# Patient Record
Sex: FEMALE | Race: WHITE | NOT HISPANIC OR LATINO | URBAN - METROPOLITAN AREA
[De-identification: names, ages, dates, MRNs, and addresses within clinical notes are randomized per-mention and may not be internally consistent; named-entity substitution may affect disease eponyms.]

---

## 2017-09-18 ENCOUNTER — APPOINTMENT (OUTPATIENT)
Dept: WOMENS IMAGING | Facility: HOSPITAL | Age: 42
End: 2017-09-18

## 2017-09-18 PROCEDURE — TOMOSCRN: Performed by: RADIOLOGY

## 2017-09-18 PROCEDURE — G0202 SCR MAMMO BI INCL CAD: HCPCS | Performed by: RADIOLOGY

## 2017-09-18 PROCEDURE — 77067 SCR MAMMO BI INCL CAD: CPT | Performed by: RADIOLOGY

## 2017-10-05 ENCOUNTER — APPOINTMENT (OUTPATIENT)
Dept: WOMENS IMAGING | Facility: HOSPITAL | Age: 42
End: 2017-10-05

## 2017-10-05 PROCEDURE — G0206 DX MAMMO INCL CAD UNI: HCPCS | Performed by: RADIOLOGY

## 2017-10-05 PROCEDURE — 76641 ULTRASOUND BREAST COMPLETE: CPT | Performed by: RADIOLOGY

## 2017-10-05 PROCEDURE — 77061 BREAST TOMOSYNTHESIS UNI: CPT | Performed by: RADIOLOGY

## 2017-10-05 PROCEDURE — 77065 DX MAMMO INCL CAD UNI: CPT | Performed by: RADIOLOGY

## 2017-10-05 PROCEDURE — G0279 TOMOSYNTHESIS, MAMMO: HCPCS | Performed by: RADIOLOGY

## 2017-10-13 ENCOUNTER — APPOINTMENT (OUTPATIENT)
Dept: WOMENS IMAGING | Facility: HOSPITAL | Age: 42
End: 2017-10-13

## 2017-10-13 PROCEDURE — 19000 PUNCTURE ASPIR CYST BREAST: CPT | Performed by: RADIOLOGY

## 2017-10-13 PROCEDURE — 76942 ECHO GUIDE FOR BIOPSY: CPT | Performed by: RADIOLOGY

## 2018-09-21 ENCOUNTER — APPOINTMENT (OUTPATIENT)
Dept: WOMENS IMAGING | Facility: HOSPITAL | Age: 43
End: 2018-09-21

## 2018-09-21 PROCEDURE — 77063 BREAST TOMOSYNTHESIS BI: CPT | Performed by: RADIOLOGY

## 2018-09-21 PROCEDURE — 77067 SCR MAMMO BI INCL CAD: CPT | Performed by: RADIOLOGY

## 2018-12-07 ENCOUNTER — APPOINTMENT (OUTPATIENT)
Dept: WOMENS IMAGING | Facility: HOSPITAL | Age: 43
End: 2018-12-07

## 2018-12-07 PROCEDURE — 76942 ECHO GUIDE FOR BIOPSY: CPT | Performed by: RADIOLOGY

## 2018-12-07 PROCEDURE — 19000 PUNCTURE ASPIR CYST BREAST: CPT | Performed by: RADIOLOGY

## 2018-12-07 PROCEDURE — 76641 ULTRASOUND BREAST COMPLETE: CPT | Performed by: RADIOLOGY

## 2019-08-09 ENCOUNTER — APPOINTMENT (OUTPATIENT)
Dept: WOMENS IMAGING | Facility: HOSPITAL | Age: 44
End: 2019-08-09

## 2019-08-09 PROCEDURE — 77065 DX MAMMO INCL CAD UNI: CPT | Performed by: RADIOLOGY

## 2019-08-09 PROCEDURE — 77061 BREAST TOMOSYNTHESIS UNI: CPT | Performed by: RADIOLOGY

## 2019-08-09 PROCEDURE — 76641 ULTRASOUND BREAST COMPLETE: CPT | Performed by: RADIOLOGY

## 2019-08-09 PROCEDURE — G0279 TOMOSYNTHESIS, MAMMO: HCPCS | Performed by: RADIOLOGY

## 2019-08-23 ENCOUNTER — APPOINTMENT (OUTPATIENT)
Dept: WOMENS IMAGING | Facility: HOSPITAL | Age: 44
End: 2019-08-23

## 2019-08-23 PROCEDURE — 76942 ECHO GUIDE FOR BIOPSY: CPT | Performed by: RADIOLOGY

## 2019-08-23 PROCEDURE — 19000 PUNCTURE ASPIR CYST BREAST: CPT | Performed by: RADIOLOGY

## 2019-09-23 ENCOUNTER — APPOINTMENT (OUTPATIENT)
Dept: WOMENS IMAGING | Facility: HOSPITAL | Age: 44
End: 2019-09-23

## 2019-09-23 PROCEDURE — 77063 BREAST TOMOSYNTHESIS BI: CPT | Performed by: RADIOLOGY

## 2019-09-23 PROCEDURE — 77067 SCR MAMMO BI INCL CAD: CPT | Performed by: RADIOLOGY

## 2020-05-05 ENCOUNTER — APPOINTMENT (OUTPATIENT)
Dept: WOMENS IMAGING | Facility: HOSPITAL | Age: 45
End: 2020-05-05

## 2020-05-05 PROCEDURE — 77065 DX MAMMO INCL CAD UNI: CPT | Performed by: RADIOLOGY

## 2020-05-05 PROCEDURE — 77061 BREAST TOMOSYNTHESIS UNI: CPT | Performed by: RADIOLOGY

## 2020-05-05 PROCEDURE — G0279 TOMOSYNTHESIS, MAMMO: HCPCS | Performed by: RADIOLOGY

## 2020-05-05 PROCEDURE — 76641 ULTRASOUND BREAST COMPLETE: CPT | Performed by: RADIOLOGY

## 2020-11-06 ENCOUNTER — APPOINTMENT (OUTPATIENT)
Dept: WOMENS IMAGING | Facility: HOSPITAL | Age: 45
End: 2020-11-06

## 2020-11-06 PROCEDURE — 77066 DX MAMMO INCL CAD BI: CPT | Performed by: RADIOLOGY

## 2020-11-06 PROCEDURE — 77062 BREAST TOMOSYNTHESIS BI: CPT | Performed by: RADIOLOGY

## 2020-11-06 PROCEDURE — 76641 ULTRASOUND BREAST COMPLETE: CPT | Performed by: RADIOLOGY

## 2020-11-06 PROCEDURE — G0279 TOMOSYNTHESIS, MAMMO: HCPCS | Performed by: RADIOLOGY

## 2020-12-01 ENCOUNTER — APPOINTMENT (OUTPATIENT)
Dept: WOMENS IMAGING | Facility: HOSPITAL | Age: 45
End: 2020-12-01

## 2020-12-01 PROCEDURE — 19083 BX BREAST 1ST LESION US IMAG: CPT | Performed by: RADIOLOGY

## 2020-12-03 ENCOUNTER — TELEPHONE (OUTPATIENT)
Dept: SURGERY | Facility: CLINIC | Age: 45
End: 2020-12-03

## 2020-12-03 NOTE — TELEPHONE ENCOUNTER
New patient appointment with Dr. Higgins is scheduled on 12/11/2020 @ 11:30am.    Called and left message with patient about appointment times, patient to call back and confirm.    Sent patient a reminder letter in the mail.

## 2020-12-11 ENCOUNTER — OFFICE VISIT (OUTPATIENT)
Dept: SURGERY | Facility: CLINIC | Age: 45
End: 2020-12-11

## 2020-12-11 VITALS
HEIGHT: 66 IN | DIASTOLIC BLOOD PRESSURE: 74 MMHG | HEART RATE: 70 BPM | RESPIRATION RATE: 16 BRPM | SYSTOLIC BLOOD PRESSURE: 118 MMHG | OXYGEN SATURATION: 98 % | BODY MASS INDEX: 24.75 KG/M2 | WEIGHT: 154 LBS

## 2020-12-11 DIAGNOSIS — D24.2 INTRADUCTAL PAPILLOMA OF LEFT BREAST: Primary | ICD-10-CM

## 2020-12-11 DIAGNOSIS — R92.8 ABNORMAL FINDING ON BREAST IMAGING: ICD-10-CM

## 2020-12-11 PROCEDURE — 99213 OFFICE O/P EST LOW 20 MIN: CPT | Performed by: SURGERY

## 2020-12-11 RX ORDER — CETIRIZINE HYDROCHLORIDE 10 MG/1
10 TABLET ORAL DAILY
COMMUNITY

## 2020-12-11 RX ORDER — FLUTICASONE PROPIONATE 50 MCG
2 SPRAY, SUSPENSION (ML) NASAL DAILY
COMMUNITY

## 2020-12-11 NOTE — PROGRESS NOTES
[facility]     Referring Provider: Olesya aMrie MD     Chief complaint: Left breast intraductal papilloma     HPI: Ms. Petty Pratt is a 46 yo woman, seen at the request of Dr. Olesya Marie, for a new diagnosis of a left breast intraductal papilloma. This was detected as an incidental imaging abnormality on recent surveillance follow-up for other probably benign lesions. She has a past history of a benign left breast cyst aspiration in 2017, a benign right breast cyst aspiration in 2018, and then a benign left breast cyst aspiration in 2019. In 2019, several left breast complex cysts were placed in imaging surveillance and she has been getting repeat imaging since. Her follow-up imaging in 11/2020 demonstrated a new left breast subareolar lesion measuring 2 mm within a 7 mm dilated duct. Subsequent biopsy showed a papilloma. See imaging and pathology report details in breast history section below.     She says that she feels like her breasts are always lumpy. Her breasts are both always a little bit sore, left more than right, and especially before her menstrual cycle. She also notices that she sometimes has yellow spots on the tip of her left nipple, but denies any actual discharge. She denies any family history of breast or ovarian cancer.       I personally reviewed her records and summarized her relevant breast history/imaging:    10/13/17, Left Breast, Cyst Aspiration ([facility]):  -An 18-gauge needle was inserted into the center of the cyst at the 4:30 position. Approximately 2 cc of yellow fluid extended into the syringe. The walls of the cyst collapsed. There was complete sonographic resolution. The fluid was discarded.    9/21/18, Screening MMG with Fede ([facility]):  Heterogeneously Dense. There are scattered equal density, oval masses with circumscribed margins seen in both breasts. These findings are most consistent with multiple cysts. These cysts have been demonstrated on prior ultrasound. Some  have enlarged and some have decreased in size since prior exams. This is a typically benign pattern. The dominant cyst is in the right breast, Subareolar and measures 2.8 cm. The cysts in the left breast have decreased in size. No suspicious masses, suspicious microcalfications or areas of architectural distortion are identified.  BI-RADS 2: Benign.    18, Right Breast US ([facility]):  There is a large simple cyst with well defined, thin margins measuring 26 millimeters seen in the right breast in the 1:30 o’clock region and in the areolar edge region. Internal echotexture is anechoic. The patient notes this lump to be bothersome and uncomfortable. A few scattered smaller cysts are noted.  BI-RADS 4 A: Suspicious.    18, Right Breast, Cyst Aspiration ([facility]):  -An 18-gauge needle was inserted into the center of the cyst at the 1:30 position. Approximately 6 cc of tan fluid extended into the syringe. The walls of the cyst collapsed. There was complete sonographic resolution. The fluid was discarded.    19, Left Diagnostic MMG with Fede & Left Breast US ([facility]):  MM. There is an equal density, round mass measuring 13 millimeters with obscured margins seen in the 4:30 o’clock region of the left breast located 3 centimeters from the nipple. Mass correlates to the palpable abnormality in the 4:30 o’clock region of the left breast located 3 centimeters from the nipple.  2. There are several similar appearing equal density, round masses with circumscribed margins seen in the left breast. There are no suspicious calcifications or areas of distortion seen.   US:  1. Ultrasound is suggestive of a round complicated cyst vs solid mass with partially defined margins measuring 13 x 8 x 12 mm.   3. There is an oval elongated cluster of cysts with circumscribed margins measuring 9 x 4 x 16 mm seen in the left breast at 4 o’clock.  IMPRESSION:  1. Probable complicated cyst vs solid mass in the 4:30 o’clock region of the  left breast located 3 centimeters from the nipple is suspicious.  2. Clusters of cysts in the left breast at 11 o’clock are probably benign. A follow-up mammogram and ultrasound in 6 months is recommended.  3. Cluster of cysts in the left breast at 4 o’clock is probably benign. Follow-up mammography in 6 months is recommended.  BI-RADS 4A: Suspicious.    19, Left Breast, Cyst Aspiration ([facility]):  -An 18-gauge needle was inserted into the center of the cyst at the 4:30 position. Approximately 1/2 cc of yellow fluid extended into the syringe. The walls of the cyst collapsed. There was complete sonographic resolution. The fluid was discarded.    19, Screening MMG with Fed[facility] ([facility]):  Heterogeneously dense.  1. There are no suspicious masses, calcifications, or areas of architectural distortion. Previously seen large round mass is no longer present. The remaining nodular parenchyma is unchanged. At this time, there is nothing to suggest malignancy.  2. There are no suspicious masses, calcifications, or areas of architectural distortion. The parenchyma is nodular and unchanged other than recently aspirated cyst is no longer identified. The other small cysts are best seen sonographically and 6 months follow-up ultrasound was previously recommended and scheduled. This is still recommended at this time. There are no new or suspicious masses, calcifications or areas of distortion see. There are no findings to suggest malignancy at this time.  BI-RADS 2: Benign.    20, Left Diagnostic MMG with Fede & Left Breast US ([facility]):  MM. There are no significant changes from the prior exams. The parenchyma includes stable nodular asymmetries. No suspicious masses, suspicious microcalfications or areas of architectural distortion are identified.  3. On the present examination, there are multiple isodense, round masses with circumscribed margins in the left breast at 11 o’clock.  US:  2. On the present examination, there is  an oval elongated cluster of cysts with circumscribed margins measuring 9 x 4 x 19 mm in the left breast at 4 o’clock. There are no significant changes from the prior exam.  3. Ultrasound demonstrates multiple oval elongated clusters of cysts with circumscribed margins measuring 8 x 4 x 9 mm in the left breast at 11 o’clock.  4. There are scattered oval anechoic simple cysts with circumscribed margins seen in the left breast.  BI-RADS 3: Probably Benign. A follow-up mammogram and ultrasound in 6 months is recommended at time of bilateral annual exam.    11/6/20, Bilateral Diagnostic MMG with Fede & Left Breast US ([facility]):  MMG:  Heterogeneously dense.  1. On the present examination, there is a stable oval mass with obscured margins in the left breast.  Additional diffuse bilateral masses are noted unchanged. The parenchyma includes stable nodular asymmetries. There are no new suspicious masses, calcifications or areas of architectural distortion in either breast.  US:  1. Ultrasound demonstrates a stable oval parallel cluster o9f cysts with circumscribed margins measuring 17 x 5 x 13 mm in the left breast.  2. On the present examination, there are multiple stable oval parallel clusters of cysts with circumscribed margins measuring 8 x 4 x 6 mm in the left breast at 11 o’clock.  3. There is a new oval mass with circumscribed margins measuring 7 x 4 x 4 mm seen in the sub-areolar region of the left breast. A 2 mm round echogenic mass or solid component is noted within the larger anechoic mass versus duct.  IMPRESSOIN:  1. Stable cluster of cysts in the left breast is probably benign. Recommend follow-up targeted ultrasound in 6 months.  2. Stable clusters of cysts in the left breast at 11 o’clock are probably benign. Recommend follow-up targeted ultrasound in 6 months.  3. Mass in the sub-areolar region of the left breast is suspicious.  BI-RADS 4A: Suspicious.    12/1/20, Left Breast, US-Guided Biopsy ([facility]):  Left  Subareolar:   Intraductal papilloma with chronic inflammation.   Also present is fibrocystic changes with ductal dilatation and apocrine metaplasia.   -[place] clip. Concordant.      Review of Systems   Constitutional: Positive for fatigue. Negative for appetite change, chills, diaphoresis, fever and unexpected weight change.        10 lb weight gain in the last 8 months   HENT:   Negative for hearing loss, lump/mass, mouth sores, nosebleeds, sore throat, tinnitus, trouble swallowing and voice change.    Eyes: Negative for eye problems and icterus.   Respiratory: Negative for chest tightness, cough, hemoptysis, shortness of breath and wheezing.    Cardiovascular: Positive for leg swelling. Negative for chest pain and palpitations.   Gastrointestinal: Negative for abdominal distention, abdominal pain, blood in stool, constipation, diarrhea, nausea, rectal pain and vomiting.   Endocrine: Negative for hot flashes.   Genitourinary: Positive for frequency. Negative for bladder incontinence, difficulty urinating, dyspareunia, dysuria, hematuria, menstrual problem, nocturia, pelvic pain, vaginal bleeding and vaginal discharge.    Musculoskeletal: Positive for back pain. Negative for arthralgias, flank pain, gait problem, myalgias, neck pain and neck stiffness.   Skin: Negative for itching, rash and wound.   Neurological: Positive for headaches. Negative for dizziness, extremity weakness, gait problem, light-headedness, numbness, seizures and speech difficulty.   Hematological: Negative for adenopathy. Does not bruise/bleed easily.   Psychiatric/Behavioral: Negative for confusion, decreased concentration, depression, sleep disturbance and suicidal ideas. The patient is nervous/anxious.    I personally reviewed and updated the ROS.      Medications:    Current Outpatient Medications:   •  fluticasone (FLONASE) 50 MCG/ACT nasal spray, 2 sprays into the nostril(s) as directed by provider Daily., Disp: , Rfl:   •  ibuprofen  (ADVIL,MOTRIN) 100 MG/5ML suspension, Take  by mouth Every 6 (Six) Hours As Needed for Mild Pain ., Disp: , Rfl:   •  cetirizine (zyrTEC) 10 MG tablet, Take 10 mg by mouth Daily., Disp: , Rfl:   •  levonorgestrel (MIRENA) 20 MCG/24HR IUD, 1 each by Intrauterine route., Disp: , Rfl:   •  sertraline (ZOLOFT) 50 MG tablet, Take 50 mg by mouth Daily., Disp: , Rfl:       Allergies   Allergen Reactions   • Penicillins Rash       Past Medical History:   Diagnosis Date   • Anxiety    • Migraine        Past Surgical History:   Procedure Laterality Date   • DILATATION AND CURETTAGE     • EAR TUBES         Family History   Problem Relation Age of Onset   • Cervical cancer Mother    • Hodgkin's lymphoma Mother 61   • Leukemia Mother 67       Social History     Socioeconomic History   • Marital status:      Spouse name: Not on file   • Number of children: 2   • Years of education: Not on file   • Highest education level: Not on file   Occupational History   • Occupation:     Tobacco Use   • Smoking status: Never Smoker   • Smokeless tobacco: Never Used   Substance and Sexual Activity   • Alcohol use: Yes     Comment: wine/cocktails 1-2 week   • Drug use: Never   • Sexual activity: Defer   Social History Narrative    2 sons      Patient drinks 1-2 servings of caffeine per day.       GYNECOLOGIC HISTORY:   G: 3. P: 2. AB: 1.  Last menstrual period: 19  Age at menarche: 11  Age at first childbirth: 29  Lactation/How lon months  Age at menopause: premenopausal  Total years of oral contraceptive use: 10  Total years of hormone replacement therapy:0      Physical Exam  Vitals:    20 1139   BP: 118/74   Pulse: 70   Resp: 16   SpO2: 98%     ECOG 0 - Asymptomatic  General: NAD, well appearing  Psych: a&o x 3, normal mood and affect  Eyes: EOMI, no scleral icterus  ENMT: neck supple without masses or thyromegaly, mucus membranes moist  Resp: normal effort, CTAB  CV: RRR, no murmurs, no edema   GI: soft, NT,  ND  MSK: normal gait, normal ROM in bilateral shoulders  Lymph nodes: no cervical, supraclavicular or axillary lymphadenopathy  Breast: symmetric, moderate size, no ptosis  Right: No visible abnormalities on inspection while seated, with arms raised or hands on hips. No masses, skin changes, or nipple abnormalities.  Left: No visible abnormalities on inspection while seated, with arms raised or hands on hips. No masses, skin changes, or nipple abnormalities.    Left breast, in-office ultrasound: At 3:00, at the areolar edge, there is a small postbiopsy hematoma with biopsy clip visualized. This is located about 5 mm deep to the skin.       I have independently reviewed her imaging and here are my findings:   In the left subareolar breast, there initially was a 2 mm mass located within a 7 mm dilated duct. At 4:00 and at 11:00, there are 2 clusters of cysts that have been stable on imaging since 8/2019.      Assessment:  45 y.o. F with a new diagnosis of a left breast intraductal papilloma. She also has several, probably benign, left breast complex cysts/cluster of cysts in imaging surveillance. She also has bilateral fibrocystic changes and bilateral breast pain.    Discussion:  We discussed the diagnosis of intraductal papilloma. I explained that historically in the past, if a papilloma was found on core needle biopsy, a larger surgical excision of the area was recommended due to the low risk of upgrade (risk of identifying atypical or cancer cells in the vicinity of the lesion). However, more recent studies have shown that with an adequate biopsy sample (large core needle), a small mass (<10 mm), clear radiologic-pathologic concordance, and no atypical findings, the risk of upgrade of a benign solitary papilloma is exceedingly low (0-6%). In these cases, routine excision is not necessary and observation with imaging surveillance is an option. I think this would be very reasonable in her, especially since she has  other lesions which will require imaging surveillance anyway. She is in agreement with this plan.    We discussed her history of multiple bilateral cysts. I explained that cysts are benign and no intervention is necessary if they are asymptomatic. I also explained that her history of cysts does not increase her lifetime risk of breast cancer, nor does her mother's history of lymphoma (and she was under the impression of these things did).    We discussed the several left breast complex cysts/cluster of cysts. I explained the concept of a BI-RADS 3 designation and the process of imaging surveillance. We discussed that a BI-RADS 3 designation describes an imaging finding that is 98-99% likely to represent a benign process. We discussed that the most common management of a BI-RADS 3 finding is initial 6 month imaging surveillance and that the entire period of imaging surveillance can often take 2 years.     We discussed breast pain and how it can sometimes be difficult to treat. We discussed that this is often related to hormonal changes. We also discussed the importance of wearing a good supportive bra. She can try wearing a sports bra during the day. She also can try sleeping in a sports bra or tight camisole.     Plan:  -Follow-up in 5/2021 with left US with NP.  -She was instructed to call sooner with any questions, concerns or changes on BSE.    Jolene Higgins MD      CC:  MD Carmina Lopez MD Leigh Price, MD

## 2020-12-14 ENCOUNTER — TELEPHONE (OUTPATIENT)
Dept: SURGERY | Facility: CLINIC | Age: 45
End: 2020-12-14

## 2020-12-14 NOTE — TELEPHONE ENCOUNTER
US is scheduled on 5/10/2021 @ 10:00am @ [facility].    F/u appointment with Shirley Nielsen NP is scheduled on 5/18/2021 @ 12:30pm.    Called and spoke to patient, patient expressed v/u of appointment times.    Sent patient a reminder letter in the mail.

## 2021-05-10 ENCOUNTER — APPOINTMENT (OUTPATIENT)
Dept: WOMENS IMAGING | Facility: HOSPITAL | Age: 46
End: 2021-05-10

## 2021-05-10 PROCEDURE — 76642 ULTRASOUND BREAST LIMITED: CPT | Performed by: RADIOLOGY

## 2021-05-18 ENCOUNTER — OFFICE VISIT (OUTPATIENT)
Dept: SURGERY | Facility: CLINIC | Age: 46
End: 2021-05-18

## 2021-05-18 ENCOUNTER — TELEPHONE (OUTPATIENT)
Dept: SURGERY | Facility: CLINIC | Age: 46
End: 2021-05-18

## 2021-05-18 VITALS
SYSTOLIC BLOOD PRESSURE: 114 MMHG | DIASTOLIC BLOOD PRESSURE: 80 MMHG | OXYGEN SATURATION: 99 % | WEIGHT: 144 LBS | HEIGHT: 66 IN | BODY MASS INDEX: 23.14 KG/M2 | HEART RATE: 55 BPM

## 2021-05-18 DIAGNOSIS — R92.2 BREAST DENSITY: ICD-10-CM

## 2021-05-18 DIAGNOSIS — R92.8 ABNORMAL FINDING ON BREAST IMAGING: ICD-10-CM

## 2021-05-18 DIAGNOSIS — D24.2 INTRADUCTAL PAPILLOMA OF LEFT BREAST: Primary | ICD-10-CM

## 2021-05-18 DIAGNOSIS — N60.11 FIBROCYSTIC BREAST CHANGES, BILATERAL: ICD-10-CM

## 2021-05-18 DIAGNOSIS — N60.12 FIBROCYSTIC BREAST CHANGES, BILATERAL: ICD-10-CM

## 2021-05-18 PROBLEM — R92.30 BREAST DENSITY: Status: ACTIVE | Noted: 2021-05-18

## 2021-05-18 PROCEDURE — 99214 OFFICE O/P EST MOD 30 MIN: CPT | Performed by: NURSE PRACTITIONER

## 2021-05-18 NOTE — PROGRESS NOTES
[facility]     Referring Provider: Dr. Olesya Marie     Chief complaint: Left breast intraductal papilloma     HPI:   12/11/2020:  Seen by Dr Higgins  MsAnat Pratt is a 44 yo woman, seen at the request of Dr. Olesya Marie, for a new diagnosis of a left breast intraductal papilloma. This was detected as an incidental imaging abnormality on recent surveillance follow-up for other probably benign lesions. She has a past history of a benign left breast cyst aspiration in 2017, a benign right breast cyst aspiration in 2018, and then a benign left breast cyst aspiration in 2019. In 2019, several left breast complex cysts were placed in imaging surveillance and she has been getting repeat imaging since. Her follow-up imaging in 11/2020 demonstrated a new left breast subareolar lesion measuring 2 mm within a 7 mm dilated duct. Subsequent biopsy showed a papilloma. See imaging and pathology report details in breast history section below.     She says that she feels like her breasts are always lumpy. Her breasts are both always a little bit sore, left more than right, and especially before her menstrual cycle. She also notices that she sometimes has yellow spots on the tip of her left nipple, but denies any actual discharge. She denies any family history of breast or ovarian cancer.       I personally reviewed her records and summarized her relevant breast history/imaging:    10/13/17, Left Breast, Cyst Aspiration ([facility]):  -An 18-gauge needle was inserted into the center of the cyst at the 4:30 position. Approximately 2 cc of yellow fluid extended into the syringe. The walls of the cyst collapsed. There was complete sonographic resolution. The fluid was discarded.    9/21/18, Screening MMG with Fede ([facility]):  Heterogeneously Dense. There are scattered equal density, oval masses with circumscribed margins seen in both breasts. These findings are most consistent with multiple cysts. These cysts have been  demonstrated on prior ultrasound. Some have enlarged and some have decreased in size since prior exams. This is a typically benign pattern. The dominant cyst is in the right breast, Subareolar and measures 2.8 cm. The cysts in the left breast have decreased in size. No suspicious masses, suspicious microcalfications or areas of architectural distortion are identified.  BI-RADS 2: Benign.    18, Right Breast US ([facility]):  There is a large simple cyst with well defined, thin margins measuring 26 millimeters seen in the right breast in the 1:30 o’clock region and in the areolar edge region. Internal echotexture is anechoic. The patient notes this lump to be bothersome and uncomfortable. A few scattered smaller cysts are noted.  BI-RADS 4 A: Suspicious.    18, Right Breast, Cyst Aspiration ([facility]):  -An 18-gauge needle was inserted into the center of the cyst at the 1:30 position. Approximately 6 cc of tan fluid extended into the syringe. The walls of the cyst collapsed. There was complete sonographic resolution. The fluid was discarded.    19, Left Diagnostic MMG with Fede & Left Breast US ([facility]):  MM. There is an equal density, round mass measuring 13 millimeters with obscured margins seen in the 4:30 o’clock region of the left breast located 3 centimeters from the nipple. Mass correlates to the palpable abnormality in the 4:30 o’clock region of the left breast located 3 centimeters from the nipple.  2. There are several similar appearing equal density, round masses with circumscribed margins seen in the left breast. There are no suspicious calcifications or areas of distortion seen.   US:  1. Ultrasound is suggestive of a round complicated cyst vs solid mass with partially defined margins measuring 13 x 8 x 12 mm.   3. There is an oval elongated cluster of cysts with circumscribed margins measuring 9 x 4 x 16 mm seen in the left breast at 4 o’clock.  IMPRESSION:  1. Probable complicated cyst vs solid  mass in the 4:30 o’clock region of the left breast located 3 centimeters from the nipple is suspicious.  2. Clusters of cysts in the left breast at 11 o’clock are probably benign. A follow-up mammogram and ultrasound in 6 months is recommended.  3. Cluster of cysts in the left breast at 4 o’clock is probably benign. Follow-up mammography in 6 months is recommended.  BI-RADS 4A: Suspicious.    19, Left Breast, Cyst Aspiration ([facility]):  -An 18-gauge needle was inserted into the center of the cyst at the 4:30 position. Approximately 1/2 cc of yellow fluid extended into the syringe. The walls of the cyst collapsed. There was complete sonographic resolution. The fluid was discarded.    19, Screening MMG with Fede ([facility]):  Heterogeneously dense.  1. There are no suspicious masses, calcifications, or areas of architectural distortion. Previously seen large round mass is no longer present. The remaining nodular parenchyma is unchanged. At this time, there is nothing to suggest malignancy.  2. There are no suspicious masses, calcifications, or areas of architectural distortion. The parenchyma is nodular and unchanged other than recently aspirated cyst is no longer identified. The other small cysts are best seen sonographically and 6 months follow-up ultrasound was previously recommended and scheduled. This is still recommended at this time. There are no new or suspicious masses, calcifications or areas of distortion see. There are no findings to suggest malignancy at this time.  BI-RADS 2: Benign.    20, Left Diagnostic MMG with Fede & Left Breast US ([facility]):  MM. There are no significant changes from the prior exams. The parenchyma includes stable nodular asymmetries. No suspicious masses, suspicious microcalfications or areas of architectural distortion are identified.  3. On the present examination, there are multiple isodense, round masses with circumscribed margins in the left breast at 11  o’clock.  US:  2. On the present examination, there is an oval elongated cluster of cysts with circumscribed margins measuring 9 x 4 x 19 mm in the left breast at 4 o’clock. There are no significant changes from the prior exam.  3. Ultrasound demonstrates multiple oval elongated clusters of cysts with circumscribed margins measuring 8 x 4 x 9 mm in the left breast at 11 o’clock.  4. There are scattered oval anechoic simple cysts with circumscribed margins seen in the left breast.  BI-RADS 3: Probably Benign. A follow-up mammogram and ultrasound in 6 months is recommended at time of bilateral annual exam.    11/6/20, Bilateral Diagnostic MMG with Fede & Left Breast US ([facility]):  MMG:  Heterogeneously dense.  1. On the present examination, there is a stable oval mass with obscured margins in the left breast.  Additional diffuse bilateral masses are noted unchanged. The parenchyma includes stable nodular asymmetries. There are no new suspicious masses, calcifications or areas of architectural distortion in either breast.  US:  1. Ultrasound demonstrates a stable oval parallel cluster o9f cysts with circumscribed margins measuring 17 x 5 x 13 mm in the left breast.  2. On the present examination, there are multiple stable oval parallel clusters of cysts with circumscribed margins measuring 8 x 4 x 6 mm in the left breast at 11 o’clock.  3. There is a new oval mass with circumscribed margins measuring 7 x 4 x 4 mm seen in the sub-areolar region of the left breast. A 2 mm round echogenic mass or solid component is noted within the larger anechoic mass versus duct.  IMPRESSOIN:  1. Stable cluster of cysts in the left breast is probably benign. Recommend follow-up targeted ultrasound in 6 months.  2. Stable clusters of cysts in the left breast at 11 o’clock are probably benign. Recommend follow-up targeted ultrasound in 6 months.  3. Mass in the sub-areolar region of the left breast is suspicious.  BI-RADS 4A:  Suspicious.    12/1/20, Left Breast, US-Guided Biopsy ([facility]):  Left Subareolar:   Intraductal papilloma with chronic inflammation.   Also present is fibrocystic changes with ductal dilatation and apocrine metaplasia.   -[place] clip. Concordant.      Dr. Higgins discussed pathology results and treatment options for intraductal papilloma, recommendations for observation.  She also discussed fibrocystic breast changes as evidenced by numerous cysts seen in prior imaging with aspiration, and current imaging surveillance.  Interventions for breast discomfort were reviewed.    Interval History:    She returns today for follow up with improved breast discomfort. She is wearing a sports bra more often.    Left breast ultrasound completed 5/10/2021 was stable BI-RADS 3. (See full report below)      Review of Systems   Constitutional: Positive for fatigue. Negative for appetite change, chills, diaphoresis, fever and unexpected weight change.        10 lb weight gain in the last 8 months   HENT:   Negative for hearing loss, lump/mass, mouth sores, nosebleeds, sore throat, tinnitus, trouble swallowing and voice change.    Eyes: Negative for eye problems and icterus.   Respiratory: Negative for chest tightness, cough, hemoptysis, shortness of breath and wheezing.    Cardiovascular: Positive for leg swelling. Negative for chest pain and palpitations.   Gastrointestinal: Negative for abdominal distention, abdominal pain, blood in stool, constipation, diarrhea, nausea, rectal pain and vomiting.   Endocrine: Negative for hot flashes.   Genitourinary: Positive for frequency. Negative for bladder incontinence, difficulty urinating, dyspareunia, dysuria, hematuria, menstrual problem, nocturia, pelvic pain, vaginal bleeding and vaginal discharge.    Musculoskeletal: Positive for back pain. Negative for arthralgias, flank pain, gait problem, myalgias, neck pain and neck stiffness.   Skin: Negative for itching, rash and wound.    Neurological: Positive for headaches. Negative for dizziness, extremity weakness, gait problem, light-headedness, numbness, seizures and speech difficulty.   Hematological: Negative for adenopathy. Does not bruise/bleed easily.   Psychiatric/Behavioral: Negative for confusion, decreased concentration, depression, sleep disturbance and suicidal ideas. The patient is nervous/anxious.    I personally reviewed and updated the ROS.      Medications:    Current Outpatient Medications:   •  cetirizine (zyrTEC) 10 MG tablet, Take 10 mg by mouth Daily., Disp: , Rfl:   •  fluticasone (FLONASE) 50 MCG/ACT nasal spray, 2 sprays into the nostril(s) as directed by provider Daily., Disp: , Rfl:   •  ibuprofen (ADVIL,MOTRIN) 100 MG/5ML suspension, Take  by mouth Every 6 (Six) Hours As Needed for Mild Pain ., Disp: , Rfl:   •  levonorgestrel (MIRENA) 20 MCG/24HR IUD, 1 each by Intrauterine route., Disp: , Rfl:   •  sertraline (ZOLOFT) 50 MG tablet, Take 50 mg by mouth Daily., Disp: , Rfl:       Allergies   Allergen Reactions   • Penicillins Rash       Past Medical History:   Diagnosis Date   • Anxiety    • Migraine        Past Surgical History:   Procedure Laterality Date   • DILATATION AND CURETTAGE     • EAR TUBES         Family History   Problem Relation Age of Onset   • Cervical cancer Mother    • Hodgkin's lymphoma Mother 61   • Leukemia Mother 67       Social History     Socioeconomic History   • Marital status:      Spouse name: Not on file   • Number of children: 2   • Years of education: Not on file   • Highest education level: Not on file   Tobacco Use   • Smoking status: Never Smoker   • Smokeless tobacco: Never Used   Substance and Sexual Activity   • Alcohol use: Yes     Comment: wine/cocktails 1-2 week   • Drug use: Never   • Sexual activity: Defer     Patient drinks 1-2 servings of caffeine per day.       GYNECOLOGIC HISTORY:   G: 3. P: 2. AB: 1.  Last menstrual period: 12/4/20  Age at menarche: 11  Age at  first childbirth: 29  Lactation/How lon months  Age at menopause: premenopausal  Total years of oral contraceptive use: 10  Total years of hormone replacement therapy:0      Physical Exam  Vitals:    21 1219   BP: 114/80   Pulse: 55   SpO2: 99%     ECOG 0 - Asymptomatic  General: NAD, well appearing  Psych: a&o x 3, normal mood and affect  MSK: normal gait, normal ROM in bilateral shoulders  Lymph nodes: no cervical, supraclavicular or axillary lymphadenopathy  Breast: symmetric, moderate size, no ptosis  Right: No visible abnormalities on inspection while seated, with arms raised or hands on hips. No masses, skin changes, or nipple abnormalities.  Left: No visible abnormalities on inspection while seated, with arms raised or hands on hips. No masses, skin changes, or nipple abnormalities.    Im[facility]10[facility]2021: [facility]  Finding 1: Follow-up examination was performed for the cluster of cysts in the left breast, 11:00 seen on 2020.  On present exam, there is a stable oval parallel complicated cyst with circumscribed margins measuring 8 x 3 x 8 mm in the left breast at 11:00 located 1 cm from the nipple.  Finding 2: Follow-up exam was performed for the mass in the left breast, subareolar seen on 2020.  There is no ultrasound correlate status post biopsy.  Only more posterior simple cysts or anechoic duct ectasia seen.  Finding 3: Follow-up exam was performed for the oval mass seen on 2020.  On present exam, there is a cluster of cysts with circumscribed margins measuring 15 x 3 x 6 mm in the left breast at 4:00.  Finding has decreased in size.  No suspicious change or any new worrisome findings seen.  Impression:  Finding 1: Stable complicated cyst in left breast 11:00 located 1 cm from the nipple is probably benign.  A follow-up mammogram and ultrasound in 6 months is recommended.  Finding 2: Area in the left breast status post biopsy is  probably benign.  Follow-up mammogram ultrasound in 6 months is recommended.  Finding 3: Stable cluster of cysts in the left breast 4:00 is benign negative.  BI-RADS Category 3    Assessment:   1)   46 y.o. F with a new diagnosis of a left breast intraductal papilloma.     2)  Abnormal imaging left breast(5/5/2020)  She also has several, probably benign, left breast complex cysts/cluster of cysts in imaging surveillance.     3) Fibrocystic changes with bilateral breast pain.    4)  Breast density    Discussion:  1)  Dr Higgins discussed the diagnosis of intraductal papilloma. She explained that historically in the past, if a papilloma was found on core needle biopsy, a larger surgical excision of the area was recommended due to the low risk of upgrade (risk of identifying atypical or cancer cells in the vicinity of the lesion). However, more recent studies have shown that with an adequate biopsy sample (large core needle), a small mass (<10 mm), clear radiologic-pathologic concordance, and no atypical findings, the risk of upgrade of a benign solitary papilloma is exceedingly low (0-6%). In these cases, routine excision is not necessary and observation with imaging surveillance is an option. I think this would be very reasonable in her, especially since she has other lesions which will require imaging surveillance anyway. She is in agreement with this plan.    2)  We discussed the several left breast complex cysts/cluster of cysts. I explained the concept of a BI-RADS 3 designation and the process of imaging surveillance. We discussed that a BI-RADS 3 designation describes an imaging finding that is 98-99% likely to represent a benign process. We discussed that the most common management of a BI-RADS 3 finding is initial 6 month imaging surveillance and that the entire period of imaging surveillance can often take 2 years.     We discussed her history of multiple bilateral cysts. I explained that cysts are benign and  no intervention is necessary if they are asymptomatic. I also explained that her history of cysts does not increase her lifetime risk of breast cancer, nor does her mother's history of lymphoma (and she was under the impression of these things did).      3)  We discussed fibrocystic change and how this is benign and can sometimes be associated with increased breast density. I reassured her today that she had a normal clinical breast exam and recent normal bilateral imaging. I explained that cords of dense breast tissue or focal areas of fibrocystic change can sometimes feel like a lump on exam. This is common in women like her with heterogeneous and nodular tissue. I encouraged her to become familiar with her own self-exam over the next few months to establish a personal baseline.  Dr Higgins discussed breast pain and how it can sometimes be difficult to treat. We discussed that this is often related to hormonal changes. We also discussed the importance of wearing a good supportive bra. She can try wearing a sports bra during the day. She also can try sleeping in a sports bra or tight camisole. She will continue this practice.    5)  Breast density describes how the breasts look on a mammogram.  Breast and connective tissue are denser than fat and this difference shows up on the mammogram.  Young women often have dense breasts.  As we age, breast become less dense.  Dense breast can make it harder to find breast cancer on the mammogram.  Women with high breast density have an increased risk of breast cancer.  Educational materials regarding breast density were given and reviewed.  Tomosynthesis imaging will be completed with next screening study.    Plan:  -bilateral diagnostic mammogram with tomosynthesis and left limited US in 6 months at [facility] followed by exam    -She was instructed to call sooner with any questions, concerns or changes on BSE.    ENRIQUETA Sibley     I spent 35 minutes caring for Ms Pratt on  this date of service. This time includes time spent by me in the following activities: preparing for the visit, performing a medically appropriate examination and/or evaluation , counseling and educating the patient/family/caregiver, ordering medications, tests, or procedures and documenting information in the medical record.          CC:  MD Jaylyn Garcia MD

## 2021-11-22 ENCOUNTER — APPOINTMENT (OUTPATIENT)
Dept: WOMENS IMAGING | Facility: HOSPITAL | Age: 46
End: 2021-11-22

## 2021-11-22 PROCEDURE — 76641 ULTRASOUND BREAST COMPLETE: CPT | Performed by: RADIOLOGY

## 2021-11-22 PROCEDURE — G0279 TOMOSYNTHESIS, MAMMO: HCPCS | Performed by: RADIOLOGY

## 2021-11-22 PROCEDURE — 77066 DX MAMMO INCL CAD BI: CPT | Performed by: RADIOLOGY

## 2021-11-22 PROCEDURE — 77062 BREAST TOMOSYNTHESIS BI: CPT | Performed by: RADIOLOGY

## 2021-11-30 ENCOUNTER — OFFICE VISIT (OUTPATIENT)
Dept: SURGERY | Facility: CLINIC | Age: 46
End: 2021-11-30

## 2021-11-30 VITALS
OXYGEN SATURATION: 99 % | SYSTOLIC BLOOD PRESSURE: 113 MMHG | HEART RATE: 64 BPM | HEIGHT: 66 IN | BODY MASS INDEX: 23.14 KG/M2 | DIASTOLIC BLOOD PRESSURE: 69 MMHG | WEIGHT: 144 LBS

## 2021-11-30 DIAGNOSIS — D24.2 INTRADUCTAL PAPILLOMA OF LEFT BREAST: Primary | ICD-10-CM

## 2021-11-30 DIAGNOSIS — R92.2 BREAST DENSITY: ICD-10-CM

## 2021-11-30 DIAGNOSIS — N60.11 FIBROCYSTIC BREAST CHANGES, BILATERAL: ICD-10-CM

## 2021-11-30 DIAGNOSIS — R92.8 ABNORMAL FINDING ON BREAST IMAGING: ICD-10-CM

## 2021-11-30 DIAGNOSIS — N60.12 FIBROCYSTIC BREAST CHANGES, BILATERAL: ICD-10-CM

## 2021-11-30 PROCEDURE — 99213 OFFICE O/P EST LOW 20 MIN: CPT | Performed by: NURSE PRACTITIONER

## 2021-11-30 NOTE — PROGRESS NOTES
[facility]     Referring Provider: Dr. Olesya Marie     Chief complaint: Left breast intraductal papilloma     HPI:   12/11/2020:  Seen by Dr Higgins  MsAnat Pratt is a 46 yo woman, seen at the request of Dr. Olesya Marie, for a new diagnosis of a left breast intraductal papilloma. This was detected as an incidental imaging abnormality on recent surveillance follow-up for other probably benign lesions. She has a past history of a benign left breast cyst aspiration in 2017, a benign right breast cyst aspiration in 2018, and then a benign left breast cyst aspiration in 2019. In 2019, several left breast complex cysts were placed in imaging surveillance and she has been getting repeat imaging since. Her follow-up imaging in 11/2020 demonstrated a new left breast subareolar lesion measuring 2 mm within a 7 mm dilated duct. Subsequent biopsy showed a papilloma. See imaging and pathology report details in breast history section below.     She says that she feels like her breasts are always lumpy. Her breasts are both always a little bit sore, left more than right, and especially before her menstrual cycle. She also notices that she sometimes has yellow spots on the tip of her left nipple, but denies any actual discharge. She denies any family history of breast or ovarian cancer.       I personally reviewed her records and summarized her relevant breast history/imaging:    10/13/17, Left Breast, Cyst Aspiration ([facility]):  -An 18-gauge needle was inserted into the center of the cyst at the 4:30 position. Approximately 2 cc of yellow fluid extended into the syringe. The walls of the cyst collapsed. There was complete sonographic resolution. The fluid was discarded.    9/21/18, Screening MMG with Fede ([facility]):  Heterogeneously Dense. There are scattered equal density, oval masses with circumscribed margins seen in both breasts. These findings are most consistent with multiple cysts. These cysts have been  demonstrated on prior ultrasound. Some have enlarged and some have decreased in size since prior exams. This is a typically benign pattern. The dominant cyst is in the right breast, Subareolar and measures 2.8 cm. The cysts in the left breast have decreased in size. No suspicious masses, suspicious microcalfications or areas of architectural distortion are identified.  BI-RADS 2: Benign.    18, Right Breast US ([facility]):  There is a large simple cyst with well defined, thin margins measuring 26 millimeters seen in the right breast in the 1:30 o’clock region and in the areolar edge region. Internal echotexture is anechoic. The patient notes this lump to be bothersome and uncomfortable. A few scattered smaller cysts are noted.  BI-RADS 4 A: Suspicious.    18, Right Breast, Cyst Aspiration ([facility]):  -An 18-gauge needle was inserted into the center of the cyst at the 1:30 position. Approximately 6 cc of tan fluid extended into the syringe. The walls of the cyst collapsed. There was complete sonographic resolution. The fluid was discarded.    19, Left Diagnostic MMG with Fede & Left Breast US ([facility]):  MM. There is an equal density, round mass measuring 13 millimeters with obscured margins seen in the 4:30 o’clock region of the left breast located 3 centimeters from the nipple. Mass correlates to the palpable abnormality in the 4:30 o’clock region of the left breast located 3 centimeters from the nipple.  2. There are several similar appearing equal density, round masses with circumscribed margins seen in the left breast. There are no suspicious calcifications or areas of distortion seen.   US:  1. Ultrasound is suggestive of a round complicated cyst vs solid mass with partially defined margins measuring 13 x 8 x 12 mm.   3. There is an oval elongated cluster of cysts with circumscribed margins measuring 9 x 4 x 16 mm seen in the left breast at 4 o’clock.  IMPRESSION:  1. Probable complicated cyst vs solid  mass in the 4:30 o’clock region of the left breast located 3 centimeters from the nipple is suspicious.  2. Clusters of cysts in the left breast at 11 o’clock are probably benign. A follow-up mammogram and ultrasound in 6 months is recommended.  3. Cluster of cysts in the left breast at 4 o’clock is probably benign. Follow-up mammography in 6 months is recommended.  BI-RADS 4A: Suspicious.    19, Left Breast, Cyst Aspiration ([facility]):  -An 18-gauge needle was inserted into the center of the cyst at the 4:30 position. Approximately 1/2 cc of yellow fluid extended into the syringe. The walls of the cyst collapsed. There was complete sonographic resolution. The fluid was discarded.    19, Screening MMG with Fede ([facility]):  Heterogeneously dense.  1. There are no suspicious masses, calcifications, or areas of architectural distortion. Previously seen large round mass is no longer present. The remaining nodular parenchyma is unchanged. At this time, there is nothing to suggest malignancy.  2. There are no suspicious masses, calcifications, or areas of architectural distortion. The parenchyma is nodular and unchanged other than recently aspirated cyst is no longer identified. The other small cysts are best seen sonographically and 6 months follow-up ultrasound was previously recommended and scheduled. This is still recommended at this time. There are no new or suspicious masses, calcifications or areas of distortion see. There are no findings to suggest malignancy at this time.  BI-RADS 2: Benign.    20, Left Diagnostic MMG with Fede & Left Breast US ([facility]):  MM. There are no significant changes from the prior exams. The parenchyma includes stable nodular asymmetries. No suspicious masses, suspicious microcalfications or areas of architectural distortion are identified.  3. On the present examination, there are multiple isodense, round masses with circumscribed margins in the left breast at 11  o’clock.  US:  2. On the present examination, there is an oval elongated cluster of cysts with circumscribed margins measuring 9 x 4 x 19 mm in the left breast at 4 o’clock. There are no significant changes from the prior exam.  3. Ultrasound demonstrates multiple oval elongated clusters of cysts with circumscribed margins measuring 8 x 4 x 9 mm in the left breast at 11 o’clock.  4. There are scattered oval anechoic simple cysts with circumscribed margins seen in the left breast.  BI-RADS 3: Probably Benign. A follow-up mammogram and ultrasound in 6 months is recommended at time of bilateral annual exam.    11/6/20, Bilateral Diagnostic MMG with Fede & Left Breast US ([facility]):  MMG:  Heterogeneously dense.  1. On the present examination, there is a stable oval mass with obscured margins in the left breast.  Additional diffuse bilateral masses are noted unchanged. The parenchyma includes stable nodular asymmetries. There are no new suspicious masses, calcifications or areas of architectural distortion in either breast.  US:  1. Ultrasound demonstrates a stable oval parallel cluster o9f cysts with circumscribed margins measuring 17 x 5 x 13 mm in the left breast.  2. On the present examination, there are multiple stable oval parallel clusters of cysts with circumscribed margins measuring 8 x 4 x 6 mm in the left breast at 11 o’clock.  3. There is a new oval mass with circumscribed margins measuring 7 x 4 x 4 mm seen in the sub-areolar region of the left breast. A 2 mm round echogenic mass or solid component is noted within the larger anechoic mass versus duct.  IMPRESSOIN:  1. Stable cluster of cysts in the left breast is probably benign. Recommend follow-up targeted ultrasound in 6 months.  2. Stable clusters of cysts in the left breast at 11 o’clock are probably benign. Recommend follow-up targeted ultrasound in 6 months.  3. Mass in the sub-areolar region of the left breast is suspicious.  BI-RADS 4A:  Suspicious.    12/1/20, Left Breast, US-Guided Biopsy ([facility]):  Left Subareolar:   Intraductal papilloma with chronic inflammation.   Also present is fibrocystic changes with ductal dilatation and apocrine metaplasia.   -[place] clip. Concordant.      Dr. Higgins discussed pathology results and treatment options for intraductal papilloma, recommendations for observation.  She also discussed fibrocystic breast changes as evidenced by numerous cysts seen in prior imaging with aspiration, and current imaging surveillance.  Interventions for breast discomfort were reviewed.    5/18/21:  She returns today for follow up with improved breast discomfort. She is wearing a sports bra more often.  Left breast ultrasound completed 5/10/2021 was stable BI-RADS 3. (See full report below)    11/30/21, Interval History:  She returns today for follow up with no further breast discomfort since wearing sports bras most of time.    Bilateral diagnostic mammogram with tomosynthesis, left breast complete US on 11/22/21 was stable, BiRAds 2. (see full report below)      Review of Systems   Constitutional: Positive for fatigue. Negative for appetite change, chills, diaphoresis, fever and unexpected weight change.        10 lb weight gain in the last 8 months   HENT:   Negative for hearing loss, lump/mass, mouth sores, nosebleeds, sore throat, tinnitus, trouble swallowing and voice change.    Eyes: Negative for eye problems and icterus.   Respiratory: Negative for chest tightness, cough, hemoptysis, shortness of breath and wheezing.    Cardiovascular: Positive for leg swelling. Negative for chest pain and palpitations.   Gastrointestinal: Negative for abdominal distention, abdominal pain, blood in stool, constipation, diarrhea, nausea, rectal pain and vomiting.   Endocrine: Negative for hot flashes.   Genitourinary: Positive for frequency. Negative for bladder incontinence, difficulty urinating, dyspareunia, dysuria, hematuria, menstrual  problem, nocturia, pelvic pain, vaginal bleeding and vaginal discharge.    Musculoskeletal: Positive for back pain. Negative for arthralgias, flank pain, gait problem, myalgias, neck pain and neck stiffness.   Skin: Negative for itching, rash and wound.   Neurological: Positive for headaches. Negative for dizziness, extremity weakness, gait problem, light-headedness, numbness, seizures and speech difficulty.   Hematological: Negative for adenopathy. Does not bruise/bleed easily.   Psychiatric/Behavioral: Negative for confusion, decreased concentration, depression, sleep disturbance and suicidal ideas. The patient is nervous/anxious.    I personally reviewed and updated the ROS.      Medications:    Current Outpatient Medications:   •  cetirizine (zyrTEC) 10 MG tablet, Take 10 mg by mouth Daily., Disp: , Rfl:   •  fluticasone (FLONASE) 50 MCG/ACT nasal spray, 2 sprays into the nostril(s) as directed by provider Daily., Disp: , Rfl:   •  ibuprofen (ADVIL,MOTRIN) 100 MG/5ML suspension, Take  by mouth Every 6 (Six) Hours As Needed for Mild Pain ., Disp: , Rfl:   •  levonorgestrel (MIRENA) 20 MCG/24HR IUD, 1 each by Intrauterine route., Disp: , Rfl:   •  sertraline (ZOLOFT) 50 MG tablet, Take 50 mg by mouth Daily., Disp: , Rfl:       Allergies   Allergen Reactions   • Penicillins Rash       Past Medical History:   Diagnosis Date   • Anxiety    • Migraine        Past Surgical History:   Procedure Laterality Date   • DILATATION AND CURETTAGE     • EAR TUBES         Family History   Problem Relation Age of Onset   • Cervical cancer Mother    • Hodgkin's lymphoma Mother 61   • Leukemia Mother 67       Social History     Socioeconomic History   • Marital status:    • Number of children: 2   Tobacco Use   • Smoking status: Never Smoker   • Smokeless tobacco: Never Used   Substance and Sexual Activity   • Alcohol use: Yes     Comment: wine/cocktails 1-2 week   • Drug use: Never   • Sexual activity: Defer     Patient  drinks 1-2 servings of caffeine per day.       GYNECOLOGIC HISTORY:   G: 3. P: 2. AB: 1.  Last menstrual period: 19  Age at menarche: 11  Age at first childbirth: 29  Lactation/How lon months  Age at menopause: premenopausal  Total years of oral contraceptive use: 10  Total years of hormone replacement therapy:0      Physical Exam  Vitals:    21 1219   BP: 113/69   Pulse: 64   SpO2: 99%      I reviewed physical exam, no changes noted    ECOG 0 - Asymptomatic  General: NAD, well appearing  Psych: a&o x 3, normal mood and affect  MSK: normal gait, normal ROM in bilateral shoulders  Lymph nodes: no cervical, supraclavicular or axillary lymphadenopathy  Breast: symmetric, moderate size, no ptosis  Right: No visible abnormalities on inspection while seated, with arms raised or hands on hips. No masses, skin changes, or nipple abnormalities.  Left: No visible abnormalities on inspection while seated, with arms raised or hands on hips. No masses, skin changes, or nipple abnormalities.    Imagin[facility]10/2021: [facility]  Finding 1: Follow-up examination was performed for the cluster of cysts in the left breast, 11:00 seen on 2020.  On present exam, there is a stable oval parallel complicated cyst with circumscribed margins measuring 8 x 3 x 8 mm in the left breast at 11:00 located 1 cm from the nipple.  Finding 2: Follow-up exam was performed for the mass in the left breast, subareolar seen on 2020.  There is no ultrasound correlate status post biopsy.  Only more posterior simple cysts or anechoic duct ectasia seen.  Finding 3: Follow-up exam was performed for the oval mass seen on 2020.  On present exam, there is a cluster of cysts with circumscribed margins measuring 15 x 3 x 6 mm in the left breast at 4:00.  Finding has decreased in size.  No suspicious change or any new worrisome findings seen.  Impression:  Finding 1: Stable complicated cyst  in left breast 11:00 located 1 cm from the nipple is probably benign.  A follow-up mammogram and ultrasound in 6 months is recommended.  Finding 2: Area in the left breast status post biopsy is probably benign.  Follow-up mammogram ultrasound in 6 months is recommended.  Finding 3: Stable cluster of cysts in the left breast 4:00 is benign negative.  BI-RADS Category 3    11/22/2021: Bilateral diagnostic [facility] with tomosynthesis[facility]  Breasts are heterogeneously dense.  Finding 1: There are stable areas of asymmetric breast tissue seen in both breasts.  The parenchyma also include stable nodular asymmetries.  No suspicious masses, suspicious microcalcifications or significant areas of architectural distortion are identified.  There is been no significant change from prior exam.  Left breast complete ultrasound  Finding 2: Follow-up exam performed for complicated cyst in the left breast, 11:00 seen on 5/10/2021.  On present exam, there is a stable oval parallel anechoic septated cyst with circumscribed margins measuring 7 x 4 x 5 mm left breast 11:00 1 cm from the nipple.  Finding has become better defined compared to prior exam.  Finding 3: Follow-up examination was performed for mass in the left breast, subareolar seen on 5/10/2021.  On present exam, there is an oval elongated anechoic septated cyst with circumscribed margins measuring 9 mm in the subareolar region of the left breast.  No solid abnormalities are seen.  Scattered small simple cysts noted.  All 4 quadrants the breast, axilla, retroareolar region were imaged.  Impression:  Finding 1: Stable areas of asymmetric breast tissue in both breasts are benign negative.  Finding 2: Septated cyst left breast 11:00 located 1 cm from the nipple is benign negative.  's finding 3: Septated cyst subareolar region of the left breast is benign negative.  Screening mammogram 1 year is recommended.  BI-RADS  Category 2    Assessment:   1)   46 y.o. F with a new diagnosis of a left breast intraductal papilloma.     2)  Abnormal imaging left breast(5/5/2020), stable 11/21 with routine follow up recommended  She also has several, probably benign, left breast complex cysts/cluster of cysts in imaging surveillance.     3) Fibrocystic changes with bilateral breast pain.    4)  Breast density    Discussion:   Imaging findings were reviewed, a copy given.  The imaging findings are stable cysts in left breast, routine follow up is recommended.  The biopsy proven intraductal papilloma is stable, presents as  an oval elongated anechoic septated cyst with circumscribed margins measuring 9 mm in the subareolar region of the left breast.  No solid abnormalities are seen.  Scattered small simple cysts noted.  Routine follow up recommended.    We discussed her history of multiple bilateral cysts. I explained that cysts are benign and no intervention is necessary if they are asymptomatic. I also explained that her history of cysts does not increase her lifetime risk of breast cancer, nor does her mother's history of lymphoma (and she was under the impression of these things did).    Dr Higgins discussed breast pain and how it can sometimes be difficult to treat. We discussed that this is often related to hormonal changes. We also discussed the importance of wearing a good supportive bra. She can try wearing a sports bra during the day. She also can try sleeping in a sports bra or tight camisole. She will continue this practice.    Plan:    In view of her normal imaging and examination I will not give her a follow-up in our office but have asked her to check her self-exam and to call us in the interim with any concerns would be happy to see her back.    She will be due her screening mammogram in 12 months.    -She was instructed to call sooner with any questions, concerns or changes on BSE.    ENRIQUETA Sibley             CC:    MD Jaylyn Valles MD

## 2022-11-23 ENCOUNTER — APPOINTMENT (OUTPATIENT)
Dept: WOMENS IMAGING | Facility: HOSPITAL | Age: 47
End: 2022-11-23

## 2022-11-23 PROCEDURE — 77067 SCR MAMMO BI INCL CAD: CPT | Performed by: RADIOLOGY

## 2022-11-23 PROCEDURE — 77063 BREAST TOMOSYNTHESIS BI: CPT | Performed by: RADIOLOGY

## 2023-12-01 ENCOUNTER — APPOINTMENT (OUTPATIENT)
Dept: WOMENS IMAGING | Facility: HOSPITAL | Age: 48
End: 2023-12-01
Payer: COMMERCIAL

## 2023-12-01 PROCEDURE — 77063 BREAST TOMOSYNTHESIS BI: CPT | Performed by: RADIOLOGY

## 2023-12-01 PROCEDURE — 77067 SCR MAMMO BI INCL CAD: CPT | Performed by: RADIOLOGY

## 2024-12-04 ENCOUNTER — APPOINTMENT (OUTPATIENT)
Dept: WOMENS IMAGING | Facility: HOSPITAL | Age: 49
End: 2024-12-04
Payer: COMMERCIAL

## 2024-12-04 PROCEDURE — 77067 SCR MAMMO BI INCL CAD: CPT | Performed by: RADIOLOGY

## 2024-12-04 PROCEDURE — 77063 BREAST TOMOSYNTHESIS BI: CPT | Performed by: RADIOLOGY

## 2025-02-24 ENCOUNTER — OFFICE (OUTPATIENT)
Dept: URBAN - METROPOLITAN AREA CLINIC 64 | Facility: CLINIC | Age: 50
End: 2025-02-24

## 2025-02-24 VITALS
WEIGHT: 150 LBS | SYSTOLIC BLOOD PRESSURE: 104 MMHG | HEART RATE: 64 BPM | HEIGHT: 66 IN | DIASTOLIC BLOOD PRESSURE: 79 MMHG

## 2025-02-24 DIAGNOSIS — K92.1 MELENA: ICD-10-CM

## 2025-02-24 DIAGNOSIS — K21.9 GASTRO-ESOPHAGEAL REFLUX DISEASE WITHOUT ESOPHAGITIS: ICD-10-CM

## 2025-02-24 DIAGNOSIS — R11.0 NAUSEA: ICD-10-CM

## 2025-02-24 DIAGNOSIS — R19.4 CHANGE IN BOWEL HABIT: ICD-10-CM

## 2025-02-24 DIAGNOSIS — D50.9 IRON DEFICIENCY ANEMIA, UNSPECIFIED: ICD-10-CM

## 2025-02-24 DIAGNOSIS — R17 UNSPECIFIED JAUNDICE: ICD-10-CM

## 2025-02-24 PROCEDURE — 99204 OFFICE O/P NEW MOD 45 MIN: CPT

## 2025-02-24 RX ORDER — FAMOTIDINE 40 MG/1
40 TABLET, FILM COATED ORAL
Qty: 30 | Refills: 11 | Status: ACTIVE
Start: 2025-02-24

## 2025-03-18 ENCOUNTER — OFFICE (AMBULATORY)
Dept: URBAN - METROPOLITAN AREA PATHOLOGY 19 | Facility: PATHOLOGY | Age: 50
End: 2025-03-18
Payer: COMMERCIAL

## 2025-03-18 ENCOUNTER — ON CAMPUS - OUTPATIENT (AMBULATORY)
Dept: URBAN - METROPOLITAN AREA HOSPITAL 2 | Facility: HOSPITAL | Age: 50
End: 2025-03-18
Payer: COMMERCIAL

## 2025-03-18 VITALS
SYSTOLIC BLOOD PRESSURE: 124 MMHG | RESPIRATION RATE: 16 BRPM | SYSTOLIC BLOOD PRESSURE: 115 MMHG | DIASTOLIC BLOOD PRESSURE: 58 MMHG | HEART RATE: 47 BPM | RESPIRATION RATE: 22 BRPM | HEART RATE: 55 BPM | DIASTOLIC BLOOD PRESSURE: 68 MMHG | OXYGEN SATURATION: 100 % | HEART RATE: 56 BPM | HEART RATE: 52 BPM | HEART RATE: 48 BPM | SYSTOLIC BLOOD PRESSURE: 142 MMHG | TEMPERATURE: 97.8 F | SYSTOLIC BLOOD PRESSURE: 125 MMHG | SYSTOLIC BLOOD PRESSURE: 110 MMHG | SYSTOLIC BLOOD PRESSURE: 141 MMHG | RESPIRATION RATE: 18 BRPM | DIASTOLIC BLOOD PRESSURE: 71 MMHG | RESPIRATION RATE: 19 BRPM | SYSTOLIC BLOOD PRESSURE: 140 MMHG | HEIGHT: 66 IN | DIASTOLIC BLOOD PRESSURE: 56 MMHG | RESPIRATION RATE: 17 BRPM | HEART RATE: 72 BPM | HEART RATE: 57 BPM | DIASTOLIC BLOOD PRESSURE: 57 MMHG | WEIGHT: 148 LBS | DIASTOLIC BLOOD PRESSURE: 63 MMHG | DIASTOLIC BLOOD PRESSURE: 66 MMHG | SYSTOLIC BLOOD PRESSURE: 114 MMHG | HEART RATE: 50 BPM | HEART RATE: 46 BPM

## 2025-03-18 DIAGNOSIS — K31.89 OTHER DISEASES OF STOMACH AND DUODENUM: ICD-10-CM

## 2025-03-18 DIAGNOSIS — D50.9 IRON DEFICIENCY ANEMIA, UNSPECIFIED: ICD-10-CM

## 2025-03-18 DIAGNOSIS — K29.70 GASTRITIS, UNSPECIFIED, WITHOUT BLEEDING: ICD-10-CM

## 2025-03-18 DIAGNOSIS — R19.4 CHANGE IN BOWEL HABIT: ICD-10-CM

## 2025-03-18 DIAGNOSIS — K21.00 GASTRO-ESOPHAGEAL REFLUX DISEASE WITH ESOPHAGITIS, WITHOUT B: ICD-10-CM

## 2025-03-18 DIAGNOSIS — K92.1 MELENA: ICD-10-CM

## 2025-03-18 PROBLEM — K20.80 OTHER ESOPHAGITIS WITHOUT BLEEDING: Status: ACTIVE | Noted: 2025-03-18

## 2025-03-18 PROCEDURE — 88305 TISSUE EXAM BY PATHOLOGIST: CPT | Mod: 26 | Performed by: PATHOLOGY

## 2025-03-18 PROCEDURE — 43239 EGD BIOPSY SINGLE/MULTIPLE: CPT | Performed by: INTERNAL MEDICINE

## 2025-03-18 PROCEDURE — 45380 COLONOSCOPY AND BIOPSY: CPT | Performed by: INTERNAL MEDICINE

## 2025-06-03 ENCOUNTER — OFFICE (AMBULATORY)
Dept: URBAN - METROPOLITAN AREA CLINIC 64 | Facility: CLINIC | Age: 50
End: 2025-06-03
Payer: COMMERCIAL

## 2025-06-03 VITALS
DIASTOLIC BLOOD PRESSURE: 80 MMHG | HEART RATE: 66 BPM | SYSTOLIC BLOOD PRESSURE: 118 MMHG | WEIGHT: 156 LBS | HEIGHT: 66 IN

## 2025-06-03 DIAGNOSIS — R11.0 NAUSEA: ICD-10-CM

## 2025-06-03 DIAGNOSIS — R14.0 ABDOMINAL DISTENSION (GASEOUS): ICD-10-CM

## 2025-06-03 DIAGNOSIS — R10.31 RIGHT LOWER QUADRANT PAIN: ICD-10-CM

## 2025-06-03 DIAGNOSIS — D50.9 IRON DEFICIENCY ANEMIA, UNSPECIFIED: ICD-10-CM

## 2025-06-03 DIAGNOSIS — K21.9 GASTRO-ESOPHAGEAL REFLUX DISEASE WITHOUT ESOPHAGITIS: ICD-10-CM

## 2025-06-03 PROCEDURE — 99214 OFFICE O/P EST MOD 30 MIN: CPT

## 2025-06-03 RX ORDER — DICYCLOMINE HYDROCHLORIDE 10 MG/1
30 CAPSULE ORAL
Qty: 90 | Refills: 11 | Status: ACTIVE
Start: 2025-06-03

## 2025-06-03 RX ORDER — PANTOPRAZOLE SODIUM 40 MG/1
40 TABLET, DELAYED RELEASE ORAL
Qty: 90 | Refills: 3 | Status: ACTIVE
Start: 2025-06-03